# Patient Record
Sex: FEMALE | Race: WHITE | NOT HISPANIC OR LATINO | Employment: OTHER | URBAN - METROPOLITAN AREA
[De-identification: names, ages, dates, MRNs, and addresses within clinical notes are randomized per-mention and may not be internally consistent; named-entity substitution may affect disease eponyms.]

---

## 2019-04-15 ENCOUNTER — HOSPITAL ENCOUNTER (EMERGENCY)
Facility: HOSPITAL | Age: 54
Discharge: HOME | End: 2019-04-15
Attending: EMERGENCY MEDICINE
Payer: MEDICARE

## 2019-04-15 VITALS
BODY MASS INDEX: 26.89 KG/M2 | TEMPERATURE: 98 F | DIASTOLIC BLOOD PRESSURE: 70 MMHG | HEIGHT: 61 IN | HEART RATE: 108 BPM | OXYGEN SATURATION: 96 % | RESPIRATION RATE: 20 BRPM | WEIGHT: 142.44 LBS | SYSTOLIC BLOOD PRESSURE: 155 MMHG

## 2019-04-15 DIAGNOSIS — L73.9 FOLLICULITIS: Primary | ICD-10-CM

## 2019-04-15 PROCEDURE — 99283 EMERGENCY DEPT VISIT LOW MDM: CPT

## 2019-04-15 RX ORDER — SULFAMETHOXAZOLE AND TRIMETHOPRIM 800; 160 MG/1; MG/1
1 TABLET ORAL 2 TIMES DAILY
Qty: 14 TABLET | Refills: 0 | Status: SHIPPED | OUTPATIENT
Start: 2019-04-15 | End: 2019-04-22

## 2019-04-15 RX ORDER — SULFAMETHOXAZOLE AND TRIMETHOPRIM 800; 160 MG/1; MG/1
1 TABLET ORAL
Status: DISCONTINUED | OUTPATIENT
Start: 2019-04-15 | End: 2019-04-15 | Stop reason: HOSPADM

## 2019-04-15 RX ORDER — KETOROLAC TROMETHAMINE 10 MG/1
10 TABLET, FILM COATED ORAL
Status: DISCONTINUED | OUTPATIENT
Start: 2019-04-15 | End: 2019-04-15 | Stop reason: HOSPADM

## 2019-04-15 NOTE — DISCHARGE INSTRUCTIONS
Warm compresses 4 times daily.  Use Dial antibacterial soap.  Bactrim as prescribed.  Use ibuprofen for pain.  Follow up with her doctor in 1-2 days for re-evaluation.  Return as needed for any worsening symptoms, problems, questions or concerns.

## 2019-04-15 NOTE — ED PROVIDER NOTES
SCRIBE #1 NOTE: I, Jenny Palomares, am scribing for, and in the presence of, Pavel Trent Jr., MD. I have scribed the entire note.       History     Chief Complaint   Patient presents with    Rash     pt has scattered raised, red bumps, x8 weeks     Review of patient's allergies indicates:   Allergen Reactions    Biaxin [clarithromycin]     Hydrocodone     Tylox [oxycodone-acetaminophen]          History of Present Illness     HPI    4/15/2019, 6:47 PM  History obtained from the patient      History of Present Illness: Jimena Powell is a 54 y.o. female patient who presents to the Emergency Department for evaluation of rash which onset gradually x8 weeks ago. Pt c/o red, raised bumps to her bilat hands, neck and scalp.  Symptoms are constant and moderate in severity. No mitigating or exacerbating factors reported. Associated sxs include pain to bumps. Patient denies any swelling to the area, drainage from the site, fever, chills, numbness, weakness, n/v/d, and all other sxs at this time. No prior tx reported. No further complaints or concerns at this time.         Arrival mode: Personal vehicle     PCP: [facility]     Past Medical History:  Past medical history reviewed not relevant      Past Surgical History:  Past surgical history reviewed not relevant      Family History:  Family history reviewed not relevant      Social History:  Social History    Social History Main Topics    Social History Main Topics    Smoking status: Unknown if ever smoked    Smokeless tobacco: Unknown if ever used    Alcohol Use: Unknown drinking history    Drug Use: Unknown if ever used    Sexual Activity: Unknown        Review of Systems     Review of Systems   Constitutional: Negative for chills and fever.   HENT: Negative for sore throat.    Respiratory: Negative for shortness of breath.    Cardiovascular: Negative for chest pain.   Gastrointestinal: Negative for diarrhea, nausea and vomiting.   Genitourinary: Negative  "for dysuria.   Musculoskeletal: Negative for back pain.   Skin: Positive for rash.        (+) red, raised bumps to bilat hands, neck, and scalp   (+) pain to bumps  (-) swelling to the area  (-) drainage from the site   Neurological: Negative for weakness and numbness.   Hematological: Does not bruise/bleed easily.   All other systems reviewed and are negative.       Physical Exam     Initial Vitals [04/15/19 1837]   BP Pulse Resp Temp SpO2   (!) 155/70 108 20 98.2 °F (36.8 °C) 96 %      MAP       --          Physical Exam  Nursing Notes and Vital Signs Reviewed.  Constitutional: Patient is in no acute distress. Well-developed and well-nourished.  Head: Atraumatic. Normocephalic.  Eyes: PERRL. EOM intact. Conjunctivae are not pale. No scleral icterus.  ENT: Mucous membranes are moist. Oropharynx is clear and symmetric.    Neck: Supple. Full ROM. No lymphadenopathy.  Cardiovascular: Regular rate. Regular rhythm. No murmurs, rubs, or gallops. Distal pulses are 2+ and symmetric.  Pulmonary/Chest: No respiratory distress. Clear to auscultation bilaterally. No wheezing or rales.  Abdominal: Soft and non-distended.  There is no tenderness.  No rebound, guarding, or rigidity. Good bowel sounds.  Genitourinary: No CVA tenderness  Musculoskeletal: Moves all extremities. No obvious deformities. No edema. No calf tenderness.  Skin: Warm and dry. Mild, tender, inflamed follicles on hands, neck, and scalp.   Neurological:  Alert, awake, and appropriate.  Normal speech.  No acute focal neurological deficits are appreciated.  Psychiatric: Normal affect. Good eye contact. Appropriate in content.     ED Course   Procedures  ED Vital Signs:  Vitals:    04/15/19 1837   BP: (!) 155/70   Pulse: 108   Resp: 20   Temp: 98.2 °F (36.8 °C)   TempSrc: Oral   SpO2: 96%   Weight: 64.6 kg (142 lb 6.7 oz)   Height: 5' 1" (1.549 m)       Abnormal Lab Results:  Labs Reviewed   HEPATITIS C ANTIBODY        Imaging Results:  Imaging Results    None   "               The Emergency Provider reviewed the vital signs and test results, which are outlined above.     ED Discussion     6:52 PM: Assessed pt at this time. Discussed with pt all pertinent ED information and results. Discussed pt dx and plan of tx. Gave pt all f/u and return to the ED instructions. All questions and concerns were addressed at this time. Pt expresses understanding of information and instructions, and is comfortable with plan to discharge. Pt is stable for discharge.    I discussed with patient and/or family/caretaker that evaluation in the ED does not suggest any emergent or life threatening medical conditions requiring immediate intervention beyond what was provided in the ED, and I believe patient is safe for discharge.  Regardless, an unremarkable evaluation in the ED does not preclude the development or presence of a serious of life threatening condition. As such, patient was instructed to return immediately for any worsening or change in current symptoms.    7:11 PM  Patient is stable and nontoxic.  She has multiple small areas of folliculitis with erythematous skin lesions that are tender to the touch.  There is no abscess noted.  Use of all the hands in the scalp.  There are no burrows.  There are no lesions consistent with scabies.  Discussed all findings with the patient who verbalized understanding agreement with all.    ED Medication(s):  Medications   sulfamethoxazole-trimethoprim 800-160mg per tablet 1 tablet (has no administration in time range)   ketorolac tablet 10 mg (has no administration in time range)       New Prescriptions    SULFAMETHOXAZOLE-TRIMETHOPRIM 800-160MG (BACTRIM DS) 800-160 MG TAB    Take 1 tablet by mouth 2 (two) times daily. for 7 days       Follow-up Information     Diana Hsieh MD.    Specialty:  Family Medicine  Contact information:  [address] [place] [address] [id number] [phone]                                     Scribe  Attestation:   Scribe #1: I performed the above scribed service and the documentation accurately describes the services I performed. I attest to the accuracy of the note.     Attending:   Physician Attestation Statement for Scribe #1: I, Pavel Trent Jr., MD, personally performed the services described in this documentation, as scribed by Jenny Palomares, in my presence, and it is both accurate and complete.           Clinical Impression       ICD-10-CM ICD-9-CM   1. Folliculitis L73.9 704.8       Disposition:   Disposition: Discharged  Condition: Stable       Pavel Trent Jr., MD  04/15/19 1912